# Patient Record
Sex: FEMALE | Race: OTHER | NOT HISPANIC OR LATINO | URBAN - METROPOLITAN AREA
[De-identification: names, ages, dates, MRNs, and addresses within clinical notes are randomized per-mention and may not be internally consistent; named-entity substitution may affect disease eponyms.]

---

## 2018-08-02 ENCOUNTER — EMERGENCY (EMERGENCY)
Facility: HOSPITAL | Age: 38
LOS: 1 days | Discharge: HOME | End: 2018-08-02
Attending: EMERGENCY MEDICINE | Admitting: EMERGENCY MEDICINE
Payer: SELF-PAY

## 2018-08-02 PROCEDURE — 99284 EMERGENCY DEPT VISIT MOD MDM: CPT | Mod: 25

## 2018-08-03 VITALS
SYSTOLIC BLOOD PRESSURE: 127 MMHG | RESPIRATION RATE: 16 BRPM | HEART RATE: 57 BPM | DIASTOLIC BLOOD PRESSURE: 59 MMHG | OXYGEN SATURATION: 97 %

## 2018-08-03 VITALS
HEART RATE: 65 BPM | RESPIRATION RATE: 16 BRPM | SYSTOLIC BLOOD PRESSURE: 136 MMHG | DIASTOLIC BLOOD PRESSURE: 76 MMHG | TEMPERATURE: 98 F | OXYGEN SATURATION: 100 %

## 2018-08-03 LAB
ALBUMIN SERPL ELPH-MCNC: 4.4 G/DL — SIGNIFICANT CHANGE UP (ref 3.3–5)
ALP SERPL-CCNC: 92 U/L — SIGNIFICANT CHANGE UP (ref 40–120)
ALT FLD-CCNC: 21 U/L — SIGNIFICANT CHANGE UP (ref 4–33)
AST SERPL-CCNC: 16 U/L — SIGNIFICANT CHANGE UP (ref 4–32)
BASOPHILS # BLD AUTO: 0.05 K/UL — SIGNIFICANT CHANGE UP (ref 0–0.2)
BASOPHILS NFR BLD AUTO: 0.5 % — SIGNIFICANT CHANGE UP (ref 0–2)
BILIRUB SERPL-MCNC: 0.2 MG/DL — SIGNIFICANT CHANGE UP (ref 0.2–1.2)
BUN SERPL-MCNC: 17 MG/DL — SIGNIFICANT CHANGE UP (ref 7–23)
CALCIUM SERPL-MCNC: 9.4 MG/DL — SIGNIFICANT CHANGE UP (ref 8.4–10.5)
CHLORIDE SERPL-SCNC: 100 MMOL/L — SIGNIFICANT CHANGE UP (ref 98–107)
CO2 SERPL-SCNC: 22 MMOL/L — SIGNIFICANT CHANGE UP (ref 22–31)
CREAT SERPL-MCNC: 0.62 MG/DL — SIGNIFICANT CHANGE UP (ref 0.5–1.3)
EOSINOPHIL # BLD AUTO: 0.21 K/UL — SIGNIFICANT CHANGE UP (ref 0–0.5)
EOSINOPHIL NFR BLD AUTO: 2.1 % — SIGNIFICANT CHANGE UP (ref 0–6)
ERYTHROCYTE [SEDIMENTATION RATE] IN BLOOD: 13 MM/HR — SIGNIFICANT CHANGE UP (ref 4–25)
GLUCOSE SERPL-MCNC: 95 MG/DL — SIGNIFICANT CHANGE UP (ref 70–99)
HCT VFR BLD CALC: 38.1 % — SIGNIFICANT CHANGE UP (ref 34.5–45)
HGB BLD-MCNC: 12.7 G/DL — SIGNIFICANT CHANGE UP (ref 11.5–15.5)
IMM GRANULOCYTES # BLD AUTO: 0.02 # — SIGNIFICANT CHANGE UP
IMM GRANULOCYTES NFR BLD AUTO: 0.2 % — SIGNIFICANT CHANGE UP (ref 0–1.5)
LYMPHOCYTES # BLD AUTO: 2.95 K/UL — SIGNIFICANT CHANGE UP (ref 1–3.3)
LYMPHOCYTES # BLD AUTO: 29.4 % — SIGNIFICANT CHANGE UP (ref 13–44)
MCHC RBC-ENTMCNC: 28.7 PG — SIGNIFICANT CHANGE UP (ref 27–34)
MCHC RBC-ENTMCNC: 33.3 % — SIGNIFICANT CHANGE UP (ref 32–36)
MCV RBC AUTO: 86 FL — SIGNIFICANT CHANGE UP (ref 80–100)
MONOCYTES # BLD AUTO: 0.76 K/UL — SIGNIFICANT CHANGE UP (ref 0–0.9)
MONOCYTES NFR BLD AUTO: 7.6 % — SIGNIFICANT CHANGE UP (ref 2–14)
NEUTROPHILS # BLD AUTO: 6.03 K/UL — SIGNIFICANT CHANGE UP (ref 1.8–7.4)
NEUTROPHILS NFR BLD AUTO: 60.2 % — SIGNIFICANT CHANGE UP (ref 43–77)
NRBC # FLD: 0 — SIGNIFICANT CHANGE UP
PLATELET # BLD AUTO: 355 K/UL — SIGNIFICANT CHANGE UP (ref 150–400)
PMV BLD: 9.1 FL — SIGNIFICANT CHANGE UP (ref 7–13)
POTASSIUM SERPL-MCNC: 4.4 MMOL/L — SIGNIFICANT CHANGE UP (ref 3.5–5.3)
POTASSIUM SERPL-SCNC: 4.4 MMOL/L — SIGNIFICANT CHANGE UP (ref 3.5–5.3)
PROT SERPL-MCNC: 7.1 G/DL — SIGNIFICANT CHANGE UP (ref 6–8.3)
RBC # BLD: 4.43 M/UL — SIGNIFICANT CHANGE UP (ref 3.8–5.2)
RBC # FLD: 12.3 % — SIGNIFICANT CHANGE UP (ref 10.3–14.5)
SODIUM SERPL-SCNC: 137 MMOL/L — SIGNIFICANT CHANGE UP (ref 135–145)
WBC # BLD: 10.02 K/UL — SIGNIFICANT CHANGE UP (ref 3.8–10.5)
WBC # FLD AUTO: 10.02 K/UL — SIGNIFICANT CHANGE UP (ref 3.8–10.5)

## 2018-08-03 RX ORDER — KETOROLAC TROMETHAMINE 30 MG/ML
15 SYRINGE (ML) INJECTION ONCE
Qty: 0 | Refills: 0 | Status: DISCONTINUED | OUTPATIENT
Start: 2018-08-03 | End: 2018-08-03

## 2018-08-03 RX ADMIN — Medication 15 MILLIGRAM(S): at 02:31

## 2018-08-03 RX ADMIN — Medication 15 MILLIGRAM(S): at 02:46

## 2018-08-03 NOTE — ED PROVIDER NOTE - MEDICAL DECISION MAKING DETAILS
eye pain without vision loss or worrisome findings on hx or prelim exam.     other DDx includes allergic conjunctivitis  will florocein for corneal injury & check IOP

## 2018-08-03 NOTE — ED ADULT NURSE NOTE - NSIMPLEMENTINTERV_GEN_ALL_ED
Implemented All Universal Safety Interventions:  [place] to call system. Call bell, personal items and telephone within reach. Instruct patient to call for assistance. Room bathroom lighting operational. Non-slip footwear when patient is off stretcher. Physically safe environment: no spills, clutter or unnecessary equipment. Stretcher in lowest position, wheels locked, appropriate side rails in place.

## 2018-08-03 NOTE — ED ADULT NURSE NOTE - CHPI ED NUR SYMPTOMS NEG
no purulent drainage/no blurred vision/no double vision/no photophobia/no drainage/no eye lid swelling/no itching

## 2018-08-03 NOTE — ED PROVIDER NOTE - CARE PLAN
Principal Discharge DX:	Eye pain, right Principal Discharge DX:	Eye pain, right  Assessment and plan of treatment:	-- Please follow up with an eye doctor.  Please call for an appointment as soon as possible.  Explain why you were in the Emergency Department and that you need a follow up visit for continued outpatient care as soon as possible.  We will give you a list of these doctors but make sure your health insurance covers the doctor before making an appointment.   -- We advise you to take zyrtec, which should help your allergies & your eye.  Please pick it up as soon as possible and take as directed.  We advise to refrain from consuming alcohol or grapefruit juice while taking any medication until checking with the doctor or pharmacist. Principal Discharge DX:	Eye pain, right  Assessment and plan of treatment:	-- Please follow up with an eye doctor.  Please call for an appointment as soon as possible.  Explain why you were in the Emergency Department and that you need a follow up visit for continued outpatient care as soon as possible.  We will give you a list of these doctors but make sure your health insurance covers the doctor before making an appointment.   -- Follow up with our ophtho clinic ([address] [place] [address] [address] [place] [address]) by calling [phone] or our private optho group within 1 day.  -- Please use 650mg Tylenol (also called acetaminophen) every 4 hours & 600mg Motrin (also called Advil or ibuprofen) every 6 hours as needed for pain/discomfort/swelling. You can get these without a prescription. Don't use more than 3500mg of Tylenol in any 24-hour period. Make sure your other prescription/over-the-counter medications don't contain any Tylenol so you don't take too much. If you have any stomach discomfort while taking Motrin, you can use TUMS or Pepcid or Zantac (these can also be bought without a prescription).   -- Use the eye drops we gave you for comfort.    -- We advise you to take zyrtec, which should help your allergies & your eye.  Please pick it up as soon as possible and take as directed.  We advise to refrain from consuming alcohol or grapefruit juice while taking any medication until checking with the doctor or pharmacist.

## 2018-08-03 NOTE — ED PROVIDER NOTE - ATTENDING CONTRIBUTION TO CARE
36 y/o F with no significant PMH here with right eye pain.  She reports yesterday night she noted a foreign body sensation to the eye before going to bed.  Upon wakening, she cont to have the sensation which progressed to irritation and pain over the course of the day.  (+)tearing (+)photophobia.  Pain radiates from eye to right side of face.  Pt does not wear contacts or glasses.  Pt reports occasional spots, but no floaters, no vision loss or change.  Denies known trauma to the eye.  Pt is sitting in stretcher, awake and alert, nontoxic.  VSS.  NCAT, EOMI, pupil 3mm equal and reactive to light.  No proptosis, no pain with EOMI.  TTP along right forehead and temporal area.  (+)scleral injection.  No visualized foreign body to eye or under eyelid.  No corneal abrasion or defect on fluorecein exam.  IOP 13mmhg.  Normal visual acuity and visual fields.  CN II-XII intact.  Lungs cta bl.  Cards nl S1/S2, RRR, no MRG.  Abd soft ntnd.   No e/o trauma or foreign body, no glaucoma, no vision loss concerning for retinal pathology.  Will rx with nsaids, artificial drops, urgent outpatient ophtho follow-up in 24hr.

## 2018-08-03 NOTE — ED ADULT NURSE NOTE - OBJECTIVE STATEMENT
pt received to tr JAIMIE. complains of eye pain and redness. also periorbital headache and seeing spots occasionally. denies any blurry vision, weakness, numbness, dizziness, n/v. labs sent. 20 g iv placed in left ac. will monitor.

## 2018-08-03 NOTE — ED PROVIDER NOTE - PHYSICAL EXAMINATION
VA: 20/20 b/l  pupils 3mm b/l, reactive. No APD  +b/l conjunctival injection  clear tears  no hyphema or hypopion  periorbital: no rash, +ttp VA: 20/20 b/l  pupils 3mm b/l, reactive. No APD  EOMI no ophthalmoplegia  +b/l conjunctival injection  clear tears  no hyphema or hypopion  periorbital: no rash, +ttp

## 2018-08-03 NOTE — ED PROVIDER NOTE - PLAN OF CARE
-- Please follow up with an eye doctor.  Please call for an appointment as soon as possible.  Explain why you were in the Emergency Department and that you need a follow up visit for continued outpatient care as soon as possible.  We will give you a list of these doctors but make sure your health insurance covers the doctor before making an appointment.   -- We advise you to take zyrtec, which should help your allergies & your eye.  Please pick it up as soon as possible and take as directed.  We advise to refrain from consuming alcohol or grapefruit juice while taking any medication until checking with the doctor or pharmacist. -- Please follow up with an eye doctor.  Please call for an appointment as soon as possible.  Explain why you were in the Emergency Department and that you need a follow up visit for continued outpatient care as soon as possible.  We will give you a list of these doctors but make sure your health insurance covers the doctor before making an appointment.   -- Follow up with our ophtho clinic ([address] [place] [address] [address] [place] [address]) by calling [phone] or our private optho group within 1 day.  -- Please use 650mg Tylenol (also called acetaminophen) every 4 hours & 600mg Motrin (also called Advil or ibuprofen) every 6 hours as needed for pain/discomfort/swelling. You can get these without a prescription. Don't use more than 3500mg of Tylenol in any 24-hour period. Make sure your other prescription/over-the-counter medications don't contain any Tylenol so you don't take too much. If you have any stomach discomfort while taking Motrin, you can use TUMS or Pepcid or Zantac (these can also be bought without a prescription).   -- Use the eye drops we gave you for comfort.    -- We advise you to take zyrtec, which should help your allergies & your eye.  Please pick it up as soon as possible and take as directed.  We advise to refrain from consuming alcohol or grapefruit juice while taking any medication until checking with the doctor or pharmacist.

## 2018-08-03 NOTE — ED ADULT NURSE NOTE - CHIEF COMPLAINT QUOTE
c/o right eye pain and redness since last night. states "feels like something is in there." also has right sided headache. also states "seeing spots when blinking". eye is red and tearing. denies any medical hx. denies any numbness/weakness, dizziness. appears very uncomfortable, tearful in triage.

## 2018-08-03 NOTE — ED PROVIDER NOTE - OBJECTIVE STATEMENT
37F now p/w 1+ day of R eye pain.  pain began as sensation of FB vs sand in her R eye but progressed to a periorbital HA w/ occasional "seeing spots when blinking". +tearing.  No blurry vision.  No photophobia.  No pain upon EOM.  No diplopia.  No trauma. No prior hx of similar sx.  No weakness, numbness, sensory changes, falls, dizziness, n/v.  Not a contact lens wearer.

## 2018-08-03 NOTE — ED ADULT TRIAGE NOTE - CHIEF COMPLAINT QUOTE
c/o right eye pain and redness since last night. states "feels like something is in there." also has right sided headache. also states "seeing spots when blinking". eye is red and tearing. denies any medical hx. denies any numbness/weakness, dizziness. c/o right eye pain and redness since last night. states "feels like something is in there." also has right sided headache. also states "seeing spots when blinking". eye is red and tearing. denies any medical hx. denies any numbness/weakness, dizziness. appears very uncomfortable, tearful in triage.

## 2018-08-31 ENCOUNTER — APPOINTMENT (OUTPATIENT)
Dept: INTERNAL MEDICINE | Facility: CLINIC | Age: 38
End: 2018-08-31
Payer: OTHER GOVERNMENT

## 2018-08-31 DIAGNOSIS — Z00.00 ENCOUNTER FOR GENERAL ADULT MEDICAL EXAMINATION W/OUT ABNORMAL FINDINGS: ICD-10-CM

## 2018-08-31 LAB
BILIRUB UR QL STRIP: 0
GLUCOSE UR-MCNC: 0
HCG UR QL: 0 EU/DL
HGB UR QL STRIP.AUTO: NORMAL
KETONES UR-MCNC: 0
LEUKOCYTE ESTERASE UR QL STRIP: 0
NITRITE UR QL STRIP: 0
PROT UR STRIP-MCNC: 0
SP GR UR STRIP: 1.03

## 2018-08-31 PROCEDURE — 99385 PREV VISIT NEW AGE 18-39: CPT

## 2020-08-19 ENCOUNTER — RESULT REVIEW (OUTPATIENT)
Age: 40
End: 2020-08-19

## 2022-01-06 ENCOUNTER — TRANSCRIPTION ENCOUNTER (OUTPATIENT)
Age: 42
End: 2022-01-06

## 2022-09-13 NOTE — ED ADULT TRIAGE NOTE - MEANS OF ARRIVAL
Is This A New Presentation, Or A Follow-Up?: Skin Lesion How Severe Is Your Skin Lesion?: mild Has Your Skin Lesion Been Treated?: not been treated What Type Of Note Output Would You Prefer (Optional)?: Standard Output ambulatory
